# Patient Record
Sex: MALE | Race: OTHER | NOT HISPANIC OR LATINO | Employment: UNEMPLOYED | URBAN - METROPOLITAN AREA
[De-identification: names, ages, dates, MRNs, and addresses within clinical notes are randomized per-mention and may not be internally consistent; named-entity substitution may affect disease eponyms.]

---
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
## 2023-08-31 ENCOUNTER — TELEPHONE (OUTPATIENT)
Age: 18
End: 2023-08-31

## 2023-08-31 NOTE — TELEPHONE ENCOUNTER
Patient called in stating that his current Derm doctor has left the practice and needs a refill on his topical face cream that he was receiving in the mail but cannot ask for a refill due to no longer having that prescriber. His acne is starting to persist and has been out of the cream for a few days now. Please call patient to discuss.

## 2023-09-01 NOTE — TELEPHONE ENCOUNTER
Looks like the patient prescribed from [facility].      From Dr. Katerina Liu note:  RA 0.025%, HA, nicotinamide, AzA    Thank you

## 2023-09-01 NOTE — TELEPHONE ENCOUNTER
Patient called in again regarding his face cream. He said his acne is getting bad and he would like the medication refill as soon as possible. Please advise patient what to do.

## 2023-09-05 ENCOUNTER — TELEPHONE (OUTPATIENT)
Age: 18
End: 2023-09-05

## 2023-09-05 NOTE — TELEPHONE ENCOUNTER
Miguelito Griffin, looks like you sent this in via [facility]. Do you have any idea why hes having issues?

## 2023-09-05 NOTE — TELEPHONE ENCOUNTER
Patient called and stated that there are two ingredients missing from his prescription and that it needs to state Dry Pigmented, Sensitive Skin    RA 0.025%, HA 0.25%, nicotinamide, AzA 8%    Patient asked if this can be fixed as soon as possible and resent.

## 2023-09-07 NOTE — TELEPHONE ENCOUNTER
Patient called and stated that there are two ingredients missing from his prescription and that it needs to state Dry Pigmented, Sensitive Skin     RA 0.025%, HA 0.25%, nicotinamide, AzA 8%     Patient asked if this can be fixed as soon as possible and resent. Copying previous notes as asked by the patient. Please review and fix script. Also please contact the patient and let them know what is going on.

## 2023-12-20 NOTE — PROGRESS NOTES
AT Treatment                   Subjective: Athlete sprained his ankle 12/31/2020  He stayed off it and has been icing  He had it x-rayed but there was no Fx (athlete is going to get me a note)       Objective: See treatment diary below      Assessment: Tolerated treatment well  Patient would benefit from continued AT      Plan: Continue per plan of care  Athlete completed     1/4/21 4 way ankle band, ABC's x4, towel scrunches x30, balace 30" x3, clam shell x20, glute bridge x20, SLR x20   Game ready 15'   1/6/2020 4 way ankle band, abc's, towel scrunches, balance, 20 bike, game ready  Dribbling and shooting   1/8/2020 4 way ankle band, abc's, towel scrunches, balance, 20 bike, game ready  Dribbling and shooting   1/9/2020 4 way ankle band, abc's, towel scrunches, balance, 20 bike, game ready   Dribbling and shooting SUBJECTIVE:  Carolann Cox is seen today at the request of Nadege Palencia MD.     Patient complained of diminished hearing sensitivity. She utilizes a left hearing aid, which is about 7 years old. She feels that she is not hearing as well lately. She reported occasional otalgia in the left ear and jaw. She was previously seen by ENT due to asymmetrical hearing loss (left worse than right).     OBJECTIVE:  AUDIOMETRIC RESULTS  Otoscopic Evaluation:  Examination reveals clear ear canals bilaterally    Audiogram:  Right ear:  Mild sensorineural hearing loss from 250-4000 Hz; normal hearing sensitivity from 7756-0980 Hz  Left ear:  Moderate rising to mild mixed hearing loss from 250-1000 Hz; moderately-severe sloping to severe mixed hearing loss from 0425-6455 Hz    Speech Audiometry:  Speech Reception Thresholds:  25 dB HL right ear and 45 dB HL left ear.  Word Recognition Test Scores:  100% right ear when presented at 65 dB HL in quiet and 92% left ear when presented at 85 dB HL in quiet.    Acoustic Immittance:  Right ear:  Did not test    Left ear:  Did not test    IMPRESSIONS:  Asymmetrical (left worse than right) hearing loss. 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 Hearing sensitivity has diminished, bilaterally, in comparison to the most recent hearing test.     RECOMMENDATIONS:  These results were reviewed with the patient and will be forwarded to Nadege Palencia MD.     1) Follow up with ENT regarding asymmetrical hearing loss  2) Consider use of binaural amplification     The patient indicated understanding of the diagnosis and was encouraged to contact our department with any questions or concerns.

## 2024-02-12 ENCOUNTER — TELEPHONE (OUTPATIENT)
Age: 19
End: 2024-02-12

## 2024-02-12 NOTE — TELEPHONE ENCOUNTER
Patient called a prescription refill and that it needs to state Dry Pigmented, Sensitive Skin     RA 0.025%, HA 0.25%, nicotinamide, AzA 8%     Patient asked if this can be sent in as soon as possible to [facility], stated his acne is bad.

## 2024-02-19 NOTE — TELEPHONE ENCOUNTER
Called and spoke with pt. Appt scheduled with ENEIDA Hsu on 3/12 for virtual visit. Did make him aware a follow up is needed if its been more than 1 year since last visit. Pt understood. He also stated he will back home the week of 3/10

## 2024-02-19 NOTE — TELEPHONE ENCOUNTER
Patient called the RX Refill Line. Message is being forwarded to the office.     Patient is requesting a call back from the office regarding this medication refill request. . He is unable to make an appt at this time because he is in college at this time. He would like this medication refilled.     Please contact patient at [phone]

## 2024-03-11 ENCOUNTER — OFFICE VISIT (OUTPATIENT)
Dept: FAMILY MEDICINE CLINIC | Facility: CLINIC | Age: 19
End: 2024-03-11
Payer: COMMERCIAL

## 2024-03-11 VITALS
RESPIRATION RATE: 16 BRPM | WEIGHT: 165.2 LBS | BODY MASS INDEX: 21.2 KG/M2 | TEMPERATURE: 97.8 F | HEART RATE: 62 BPM | SYSTOLIC BLOOD PRESSURE: 122 MMHG | OXYGEN SATURATION: 99 % | HEIGHT: 74 IN | DIASTOLIC BLOOD PRESSURE: 84 MMHG

## 2024-03-11 DIAGNOSIS — Z00.00 PERIODIC HEALTH ASSESSMENT, GENERAL SCREENING, ADULT: Primary | ICD-10-CM

## 2024-03-11 PROCEDURE — 99385 PREV VISIT NEW AGE 18-39: CPT | Performed by: FAMILY MEDICINE

## 2024-03-11 NOTE — ASSESSMENT & PLAN NOTE
Well adult.  Overall the patient appears to be in stable health.  He will obtain blood work as ordered for further evaluation.  We will make further recommendations pending results of test.

## 2024-03-11 NOTE — PROGRESS NOTES
FAMILY PRACTICE OFFICE VISIT       NAME: Nate Mckeon  AGE: 18 y.o. SEX: male       : 2005        MRN: [id number]    DATE: 3/11/2024  TIME: 9:26 AM    Assessment and Plan     Problem List Items Addressed This Visit       Periodic health assessment, general screening, adult - Primary     Well adult.  Overall the patient appears to be in stable health.  He will obtain blood work as ordered for further evaluation.  We will make further recommendations pending results of test.         Relevant Orders    Comprehensive metabolic panel    Lipid panel           Chief Complaint     Chief Complaint   Patient presents with    New Patient Visit     Established care        History of Present Illness     Patient in the office to become established with new PCP.  Patient is a sophomore at [employer].  He has studying biology and hopefully will attend medical school in the future.  He denies any recent illness.  He does not take any medications on a regular basis.  He is a non-smoker.  His immunizations appear to be up-to-date.  Patient does exercise 3 to 4 days a week either playing basketball or going to the gym for weight lifting.  He states he has lost a few pounds over the past year due to his busy school schedule.        Review of Systems   Review of Systems   Constitutional: Negative.    HENT: Negative.     Eyes: Negative.    Respiratory: Negative.     Cardiovascular: Negative.    Gastrointestinal: Negative.    Genitourinary: Negative.    Musculoskeletal: Negative.    Skin: Negative.    Neurological: Negative.    Psychiatric/Behavioral: Negative.         Active Problem List     Patient Active Problem List   Diagnosis    Patellar contusion, initial encounter    Pain in left elbow    Contusion of left elbow    Periodic health assessment, general screening, adult       Past Medical History:  Past Medical History:   Diagnosis Date    Known health problems: none        Past Surgical History:  History  reviewed. No pertinent surgical history.    Family History:  History reviewed. No pertinent family history.    Social History:  Social History     Socioeconomic History    Marital status: Single     Spouse name: Not on file    Number of children: Not on file    Years of education: Not on file    Highest education level: Not on file   Occupational History    Not on file   Tobacco Use    Smoking status: Never    Smokeless tobacco: Never   Vaping Use    Vaping status: Never Used   Substance and Sexual Activity    Alcohol use: No    Drug use: No    Sexual activity: Not on file   Other Topics Concern    Not on file   Social History Narrative    Not on file     Social Determinants of Health     Financial Resource Strain: Not on file   Food Insecurity: Not on file   Transportation Needs: Not on file   Physical Activity: Not on file   Stress: Not on file   Social Connections: Not on file   Intimate Partner Violence: Not on file   Housing Stability: Not on file       Objective     Vitals:    03/11/24 0835   BP: 122/84   Pulse: 62   Resp: 16   Temp: 97.8 °F (36.6 °C)   SpO2: 99%     Wt Readings from Last 3 Encounters:   03/11/24 74.9 kg (165 lb 3.2 oz)   08/22/22 83.5 kg (184 lb) (86%, Z= 1.08)*   12/16/21 74.1 kg (163 lb 6.4 oz) (70%, Z= 0.54)*     * Growth percentiles are based on CDC (Boys, 2-20 Years) data.       Physical Exam  Vitals and nursing note reviewed.   Constitutional:       General: He is not in acute distress.     Appearance: Normal appearance. He is well-developed. He is not ill-appearing.   HENT:      Head: Normocephalic and atraumatic.      Right Ear: Tympanic membrane, ear canal and external ear normal. There is no impacted cerumen.      Left Ear: Tympanic membrane, ear canal and external ear normal. There is no impacted cerumen.      Nose: Nose normal.   Eyes:      General:         Right eye: No discharge.         Left eye: No discharge.      Extraocular Movements: Extraocular movements intact.       "Conjunctiva/sclera: Conjunctivae normal.      Pupils: Pupils are equal, round, and reactive to light.   Neck:      Thyroid: No thyromegaly.      Vascular: No carotid bruit.   Cardiovascular:      Rate and Rhythm: Normal rate and regular rhythm.      Heart sounds: Normal heart sounds. No murmur heard.  Pulmonary:      Effort: Pulmonary effort is normal.      Breath sounds: Normal breath sounds. No wheezing, rhonchi or rales.   Abdominal:      General: Abdomen is flat. Bowel sounds are normal. There is no distension.      Palpations: Abdomen is soft.      Tenderness: There is no abdominal tenderness. There is no guarding or rebound.      Comments: NO hepatospenomegaly   Musculoskeletal:         General: Normal range of motion.      Cervical back: Normal range of motion and neck supple.      Right lower leg: No edema.      Left lower leg: No edema.   Lymphadenopathy:      Cervical: No cervical adenopathy.   Skin:     Findings: No rash.      Comments: NO RASHES   Neurological:      General: No focal deficit present.      Mental Status: He is alert and oriented to person, place, and time.      Cranial Nerves: No cranial nerve deficit.   Psychiatric:         Mood and Affect: Mood normal.         Behavior: Behavior normal.         Thought Content: Thought content normal.         Judgment: Judgment normal.         Pertinent Laboratory/Diagnostic Studies:  Lab Results   Component Value Date    BUN 13 12/04/2020    CREATININE 0.85 12/04/2020    CALCIUM 9.9 12/04/2020    K 4.4 12/04/2020    CO2 26 12/04/2020     12/04/2020     Lab Results   Component Value Date    ALT 26 12/04/2020    AST 28 12/04/2020    ALKPHOS 199 (H) 12/04/2020       No results found for: \"WBC\", \"HGB\", \"HCT\", \"MCV\", \"PLT\"    No results found for: \"TSH\"    No results found for: \"CHOL\"  No results found for: \"TRIG\"  No results found for: \"HDL\"  No results found for: \"LDLCALC\"  No results found for: \"HGBA1C\"    Results for orders placed or performed in " visit on 02/21/21   Novel Coronavirus (COVID-19), PCR [facility] - Collection at [facility]    Specimen: Nose; Nares   Result Value Ref Range    SARS-CoV-2 Negative Negative       Orders Placed This Encounter   Procedures    Comprehensive metabolic panel    Lipid panel       ALLERGIES:  No Known Allergies    Current Medications     Current Outpatient Medications   Medication Sig Dispense Refill    multivitamin (THERAGRAN) TABS Take 1 tablet by mouth daily       No current facility-administered medications for this visit.         Health Maintenance     Health Maintenance   Topic Date Due    Hepatitis C Screening  Never done    HIV Screening  Never done    Annual Physical  Never done    Influenza Vaccine (1) 09/01/2023    COVID-19 Vaccine (5 - 2023-24 season) 09/01/2023    DTaP,Tdap,and Td Vaccines (7 - Td or Tdap) 11/21/2024    Depression Screening  03/11/2025    Zoster Vaccine (1 of 2) 09/23/2053    HIB Vaccine  Completed    IPV Vaccine  Completed    Hepatitis A Vaccine  Completed    Meningococcal ACWY Vaccine  Completed    HPV Vaccine  Completed    Pneumococcal Vaccine: Pediatrics (0 to 5 Years) and At-Risk Patients (6 to 64 Years)  Aged Out     Immunization History   Administered Date(s) Administered    COVID-19 PFIZER VACCINE 0.3 ML IM 07/07/2021, 07/28/2021    COVID-19 Pfizer Vac BIVALENT Alejandro-sucrose 12 Yr+ IM 11/21/2022    COVID-19 Pfizer vac (Alejandro-sucrose, gray cap) 12 yr+ IM 08/22/2022    DTP 2003, 01/27/2004, 04/05/2004, 01/13/2005, 09/27/2007    HPV9 12/07/2015, 12/19/2016    Hep A, ped/adol, 2 dose 09/27/2007, 10/24/2008    Hep B, Adolescent or Pediatric 2005, 01/27/2004, 04/05/2004    HiB 2005, 01/27/2004, 04/05/2004, 01/13/2005    INFLUENZA 10/14/2011, 10/24/2012, 10/30/2013, 10/29/2014, 11/11/2015, 11/11/2015, 10/04/2016, 10/04/2016, 10/03/2017, 10/03/2017, 09/19/2018, 09/19/2018, 09/25/2019, 09/25/2019, 10/13/2020, 10/13/2020, 10/11/2021, 10/11/2021, 11/21/2022, 11/21/2022    IPV  2003, 01/27/2004, 04/05/2004, 09/27/2007    MMR 09/29/2004, 10/24/2008    Meningococcal B, OMV (BEXSERO) 10/11/2021, 11/21/2022    Meningococcal MCV4, Unspecified 09/25/2019    Meningococcal MCV4P 11/21/2014    Pneumococcal Conjugate PCV 7 2003, 01/27/2004, 09/29/2004    Tdap 11/21/2014    Varicella 09/29/2004, 10/24/2008       Teodoro Ball MD

## 2024-03-12 ENCOUNTER — TELEMEDICINE (OUTPATIENT)
Age: 19
End: 2024-03-12
Payer: COMMERCIAL

## 2024-03-12 VITALS — BODY MASS INDEX: 21.82 KG/M2 | HEIGHT: 74 IN | WEIGHT: 170 LBS

## 2024-03-12 DIAGNOSIS — L70.0 ACNE VULGARIS: Primary | ICD-10-CM

## 2024-03-12 PROCEDURE — 99214 OFFICE O/P EST MOD 30 MIN: CPT | Performed by: NURSE PRACTITIONER

## 2024-03-12 NOTE — PROGRESS NOTES
"[facility] Note     Patient Name: Nate Mckeon  Encounter Date: 03/12/2024     Have you been cared for by a [facility] Dermatologist in the last 3 years and, if so, which description applies to you?    Yes.  I have been here within the last 3 years, and my medical history has NOT changed since that time.  I am MALE/not capable of bearing children.    REVIEW OF SYSTEMS:  Have you recently had or currently have any of the following? No changes in my recent health.   PAST MEDICAL HISTORY:  Have you personally ever had or currently have any of the following?  If \"YES,\" then please provide more detail. No changes in my medical history.   HISTORY OF IMMUNOSUPPRESSION: Do you have a history of any of the following:  Systemic Immunosuppression such as Diabetes, Biologic or Immunotherapy, Chemotherapy, Organ Transplantation, Bone Marrow Transplantation?  No     Answering \"YES\" requires the addition of the dotphrase \"IMMUNOSUPPRESSED\" as the first diagnosis of the patient's visit.   FAMILY HISTORY:  Any \"first degree relatives\" (parent, brother, sister, or child) with the following?    No changes in my family's known health.   PATIENT EXPERIENCE:    Do you want the Dermatologist to perform a COMPLETE skin exam today including a clinical examination under the \"bra and underwear\" areas?  NO  If necessary, do we have your permission to call and leave a detailed message on your Preferred Phone number that includes your specific medical information?  Yes      No Known Allergies   Current Outpatient Medications:     multivitamin (THERAGRAN) TABS, Take 1 tablet by mouth daily, Disp: , Rfl:           Whom besides the patient is providing clinical information about today's encounter?   NO ADDITIONAL HISTORIAN (patient alone provided history)    18 year old male seen virtually for Acne vulgaris.     Physical Exam and Assessment/Plan by Diagnosis:  Virtual Regular Visit    Verification of patient " location:    Patient is located at Home in the following state in which I hold an active license [place]    Assessment/Plan:    Problem List Items Addressed This Visit    None  Visit Diagnoses       Acne vulgaris    -  Primary          Reason for visit is Acne.   Chief Complaint   Patient presents with    Acne    Virtual Regular Visit       Recent Visits  Date Type Provider Dept   03/11/24 Office Visit Teodoro Ball MD [facility]   Showing recent visits within past 7 days and meeting all other requirements  Today's Visits  Date Type Provider Dept   03/12/24 Telemedicine AKBAR Pacheco [facility] [place]   Showing today's visits and meeting all other requirements  Future Appointments  No visits were found meeting these conditions.  Showing future appointments within next 150 days and meeting all other requirements       The patient was identified by name and date of birth. Nate Mkceon was informed that this is a telemedicine visit and that the visit is being conducted through the Epic Embedded platform. He agrees to proceed..  My office door was closed. No one else was in the room.  He acknowledged consent and understanding of privacy and security of the video platform. The patient has agreed to participate and understands they can discontinue the visit at any time.    Patient is aware this is a billable service.     Subjective  Nate Mckeon is a 18 y.o. male with history of acne.    Past Medical History:   Diagnosis Date    Known health problems: none        No past surgical history on file.    Current Outpatient Medications   Medication Sig Dispense Refill    multivitamin (THERAGRAN) TABS Take 1 tablet by mouth daily       No current facility-administered medications for this visit.        No Known Allergies    Review of Systems    Video Exam:    ACNE VULGARIS    Physical Exam:  Anatomic Locations Involved: Face  Global Assessment: CLEAR:  Normal, clear skin.  Scarring Present?  "NONE  Pertinent Positives:  Pertinent Negatives:    Additional History of Present Condition:  Patient last seen in the office by Dr. Soto in August 2022.  He continues to use compounded topical: RA 0.025%, HA, nicotinamide, AzA from [facility].  Patient pleased with improvement.  He cleanses with PenOxyl wash daily and moisturizes with Cetaphil lotion.         TODAY'S PLAN:     PRESCRIPTION MANAGEMENT:  Several treatment options were discussed including topical retinoids and their side effects.     Skin Hygiene:      Wash affected areas (face, chest, and back) TWICE A DAY with a mild cleanser such as CeraVe.  Use only mild cleansers (hypoallergenic and without fragrances) and fragrance free detergent (not \"unscented\" products which contain a masking agent); we discussed avoiding irritants/fragranced products.  Apply a good oil-free facial moisturizer AT LEAST TWO TIMES A DAY \" such as CeraVe, Cetaphil.  Minimize the application of oils and cosmetics to the affected skin.  This includes HAIR PRODUCTS such as \"leave in\" conditioners.  Unless the product specifically states that it \"won't cause acne,\" \"won't clog pores,\" and/or \"is non-comedogenic\" then it may actually CAUSE acne.  If you smoke, STOP. Nicotine increases sebum retention and increased scale within the follicles, forming comedones (blackheads and whiteheads).  Abrasive treatments such as dermabrasion and spa facials may aggravate inflammatory acne.  Do NOT scratch or pick your acne bumps.  The evidence that diet directly affects acne remains weak.  However, diet does affect your overall health.  Eat plenty of fresh fruit and vegetables.  Avoid protein or amino acid supplements, particularly if they contain leucine. Consider a low-glycemic, low-protein and low-dairy diet.  Be mindful that certain medications may cause of aggravate acne.  Make sure to tell your Dermatologist if you start a new prescription, nutritional supplement, and/or herbal " remedy.      MORNING Topical Regimen:      Benzoyl Peroxide Wash:  Apply to skin while in shower/bath; gently lather into affected areas; leave on for 2-3 minutes; then, rinse completely.  PenOxyl wash: Apply on face leave in for 1-2 minutes and completely rinse off        EVENING Topical Regimen:      Topical Retinoid:   AT LEAST 1 HOUR BEFORE BEDTIME:  Evenly spread a SINGLE pea-sized amount of this medication over your entire face, avoiding the eyes and corners of the mouth.  RA 0.025%, HA 0.25%, nicotinamide, AzA 8% from [facility].  Refill completed and [facility] will be contacting patient      SYSTEMIC Strategies:      NONE     Follow up in 1 year, or sooner if needed.   MEDICAL DECISION MAKING  Treatment Goal:  Resolution of the CHRONIC condition.       Chronic condition is currently at treatment goal.          Visit Time  Total Visit Duration: 20    Encounter provider AKBAR Pacheco    Provider located at DERMATOLOGY [address] [address] [phone]    Scribe Attestation      I,:  Ella Carroll am acting as a scribe while in the presence of the attending physician.:       I,:  AKBAR Pacheco personally performed the services described in this documentation    as scribed in my presence.:

## 2024-05-01 PROBLEM — Z00.00 PERIODIC HEALTH ASSESSMENT, GENERAL SCREENING, ADULT: Status: RESOLVED | Noted: 2024-03-11 | Resolved: 2024-05-01

## 2025-06-04 ENCOUNTER — NURSE TRIAGE (OUTPATIENT)
Age: 20
End: 2025-06-04

## 2025-06-04 NOTE — TELEPHONE ENCOUNTER
Spoke with dr Ball and he suggested the patient go to urgent care and I called and left a message for patient to proceed to urgent care to have the backing removed.

## 2025-06-04 NOTE — TELEPHONE ENCOUNTER
"REASON FOR CONVERSATION: Ear Injury    SYMPTOMS: Ear backing is stuck in left ear lobe after sleeping with earrings in. Site is slightly painful and swollen    OTHER HEALTH INFORMATION: N/A    PROTOCOL DISPOSITION: Go to ED/UCC Now (Or to Office with PCP Approval)    CARE ADVICE PROVIDED: Discussed with Rula in clinical, will check with provider if able to see in office and call patient to further advise    PRACTICE FOLLOW-UP: please further advise patient after discussing with provider        Reason for Disposition   Part of jewelry (clasp) is stuck inside the earlobe    Answer Assessment - Initial Assessment Questions  1. SYMPTOMS: \"What symptoms are you concerned about?\"      Earring back is stuck in back of ear  2. ONSET:  \"When did the symptoms start?\"      Last night  3. OTHER SYMPTOMS: \"Do you have any other symptoms?\" (e.g., drainage, fever, local pain, itching)      Little bit of pain/ swelling but \"nothing crazy\"  4. PIERCING LOCATION: \"Where is the piercing?\" (e.g., ear lobe, upper ear)       Left ear lobe  5. PIERCING DATE: \"When did you get your piercing?\"      8 months ago  6. JEWELRY MATERIAL: \"What is your piercing jewelry made out of?\" (e.g., nickel, 18 K gold, surgical steel)      silver  7. ALLERGY: \"Have you ever been diagnosed with a nickel allergy?\"      denies    Protocols used: Ear Piercing Symptoms and Questions-Adult-[place]    "